# Patient Record
Sex: MALE
[De-identification: names, ages, dates, MRNs, and addresses within clinical notes are randomized per-mention and may not be internally consistent; named-entity substitution may affect disease eponyms.]

---

## 2021-04-04 ENCOUNTER — HOSPITAL ENCOUNTER (OUTPATIENT)
Dept: HOSPITAL 95 - ER | Age: 46
Setting detail: OBSERVATION
LOS: 1 days | Discharge: HOME | End: 2021-04-05
Attending: INTERNAL MEDICINE | Admitting: INTERNAL MEDICINE
Payer: COMMERCIAL

## 2021-04-04 VITALS — BODY MASS INDEX: 44.1 KG/M2 | HEIGHT: 70.98 IN | WEIGHT: 315 LBS

## 2021-04-04 DIAGNOSIS — Z87.891: ICD-10-CM

## 2021-04-04 DIAGNOSIS — T18.128A: Primary | ICD-10-CM

## 2021-04-04 DIAGNOSIS — E66.01: ICD-10-CM
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
## 2021-04-04 DIAGNOSIS — Z20.822: ICD-10-CM

## 2021-04-04 DIAGNOSIS — G47.33: ICD-10-CM

## 2021-04-04 LAB
FLUAV RNA SPEC QL NAA+PROBE: NEGATIVE
FLUBV RNA SPEC QL NAA+PROBE: NEGATIVE
RSV RNA SPEC QL NAA+PROBE: NEGATIVE
SARS-COV-2 RNA RESP QL NAA+PROBE: NEGATIVE

## 2021-04-04 PROCEDURE — 0DC58ZZ EXTIRPATION OF MATTER FROM ESOPHAGUS, VIA NATURAL OR ARTIFICIAL OPENING ENDOSCOPIC: ICD-10-PCS | Performed by: INTERNAL MEDICINE

## 2021-04-04 PROCEDURE — G0378 HOSPITAL OBSERVATION PER HR: HCPCS

## 2021-04-04 NOTE — NUR
PT INTO SDS VIA PAIGE FROM ER.
History, Chart, Medications and Allergies reviewed before start of
procedure.PT REPORTS BEING NPO SINCE 1730 WHEN HE "SWALLOWED A CHICKEN BONE".
Lungs clear T/O to Auscultation.
Patient States Post-Procedure ride home has been arranged WITH SISTER IF
DISCHARGED TONIGHT.

## 2021-04-04 NOTE — NUR
PACU RECOVERY
PATIENT AWAKE AND ON RA, SLIGHT DRY COUGH, AND SLIGHT PAIN TO THROAT. PATIENT
VERBALIZED UNDERSTANDING ON GOING SLOW WITH PO INTAKE. PATIENTS WIFE AND
SISTER AT BEDSIDE AND GOING HOME FOR THE NIGHT. PLAN FOR DC IN THE MORNING PER
DOCTOR OROURKE. REPORT CALLED TO MARITZA MONTGOMERY PATIENT TRANSFER TO ROOM 309 POST PACU
RECOVERY.

## 2021-04-04 NOTE — NUR
04/04/21 2039 Sho Neumann
History, Chart, Medications and Allergies reviewed before start of
procedure.See Anesthesia record. Bite Block Placed.

## 2021-04-05 NOTE — NUR
NIGHT SHIFT SUMMARY
 2220 PT ADMITTED TO MEDICAL FLOOR AT THIS TIME. PT WAS TRANSFERED FROM PACU
FROM RECENT SURGERY. PT IS A/O X4, TRANFERS WITH STANDBY ASSIST. PT IS
EXPERIENCING DIZZINESS FROM GENERALIZED ANESTHESIA. BED ALARM IS ON. DENIES
PAIN OTHER THAN SORE THROAT WHICH IS TO BE EXPECTED. TOLERATING ICE CHIPS
WELL. PT HAS SLEPT WELL TONIGHT SO HAS NOT ATTEMPTED TO DRINK LIQUIDS. ROOM
AIR MAINTAININING GOOD SATS. ONGOING POST OP VITALS IN PLACE. PLEASANT AND
COOPERATIVE. VSS. WILL CONTINUE TO MONITOR.

## 2021-04-05 NOTE — NUR
DISCHARGE SUMMARY
PATIENT DISCHARGED TO HOME. PATIENT ALERT AND ORIENTED THIS SHIFT. PATIENT
SHOWERED INDEPENDENTLY THIS SHIFT. IV REMOVED PRIOR TO DISCHARGE. DISCHARGE
INSTRUCTIONS EXPLAINED TO PATIENT. NO DISCHARGE MEDICATIONS. PATIENT DENIES
QUESTIONS AT THIS TIME. PATIENT TO VEHICLE BY WHEELCHAIR. FAMILY PROVIDING
TRANSPORTATION HOME.

## 2022-01-12 ENCOUNTER — HOSPITAL ENCOUNTER (INPATIENT)
Dept: HOSPITAL 95 - ER | Age: 47
LOS: 4 days | Discharge: HOME | DRG: 287 | End: 2022-01-16
Attending: HOSPITALIST | Admitting: HOSPITALIST
Payer: COMMERCIAL

## 2022-01-12 VITALS — HEIGHT: 71 IN | BODY MASS INDEX: 44.1 KG/M2 | WEIGHT: 315 LBS

## 2022-01-12 DIAGNOSIS — E66.01: ICD-10-CM

## 2022-01-12 DIAGNOSIS — Z53.29: ICD-10-CM

## 2022-01-12 DIAGNOSIS — Z98.890: ICD-10-CM

## 2022-01-12 DIAGNOSIS — I30.9: Primary | ICD-10-CM

## 2022-01-12 DIAGNOSIS — G47.33: ICD-10-CM

## 2022-01-12 DIAGNOSIS — Z20.822: ICD-10-CM

## 2022-01-12 DIAGNOSIS — Z87.891: ICD-10-CM

## 2022-01-12 DIAGNOSIS — E78.5: ICD-10-CM

## 2022-01-12 DIAGNOSIS — B34.9: ICD-10-CM

## 2022-01-12 LAB
ALBUMIN SERPL BCP-MCNC: 3.4 G/DL (ref 3.4–5)
ALBUMIN/GLOB SERPL: 0.7 {RATIO} (ref 0.8–1.8)
ALT SERPL W P-5'-P-CCNC: 46 U/L (ref 12–78)
ANION GAP SERPL CALCULATED.4IONS-SCNC: 5 MMOL/L (ref 6–16)
AST SERPL W P-5'-P-CCNC: 35 U/L (ref 12–37)
BASOPHILS # BLD AUTO: 0.04 K/MM3 (ref 0–0.23)
BASOPHILS NFR BLD AUTO: 0 % (ref 0–2)
BILIRUB SERPL-MCNC: 0.8 MG/DL (ref 0.1–1)
BUN SERPL-MCNC: 14 MG/DL (ref 8–24)
CA-I BLD-SCNC: 1.06 MMOL/L (ref 1.1–1.46)
CALCIUM SERPL-MCNC: 8.5 MG/DL (ref 8.5–10.1)
CHLORIDE BLD-SCNC: 103 MMOL/L (ref 98–108)
CHLORIDE SERPL-SCNC: 105 MMOL/L (ref 98–108)
CHOLEST SERPL-MCNC: 173 MG/DL (ref 50–200)
CHOLEST/HDLC SERPL: 5.2 {RATIO}
CO2 BLD-SCNC: 25 MMOL/L (ref 21–32)
CO2 SERPL-SCNC: 26 MMOL/L (ref 21–32)
CREAT BLD-MCNC: 1 MG/DL (ref 0.8–1.3)
CREAT SERPL-MCNC: 0.94 MG/DL (ref 0.6–1.2)
D DIMER PPP FEU-MCNC: 1.35 MG/L FEU (ref 0–0.52)
DEPRECATED RDW RBC AUTO: 41.6 FL (ref 35.1–46.3)
EOSINOPHIL # BLD AUTO: 0.23 K/MM3 (ref 0–0.68)
EOSINOPHIL NFR BLD AUTO: 2 % (ref 0–6)
ERYTHROCYTE [DISTWIDTH] IN BLOOD BY AUTOMATED COUNT: 13.2 % (ref 11.7–14.2)
FLUAV RNA SPEC QL NAA+PROBE: NEGATIVE
FLUBV RNA SPEC QL NAA+PROBE: NEGATIVE
GLOBULIN SER CALC-MCNC: 4.8 G/DL (ref 2.2–4)
GLUCOSE BLDC GLUCOMTR-MCNC: 135 MG/DL (ref 70–99)
GLUCOSE SERPL-MCNC: 140 MG/DL (ref 70–99)
HCT VFR BLD AUTO: 43.4 % (ref 37–53)
HCT VFR BLD CALC: 43 % (ref 41–53)
HDLC SERPL-MCNC: 33 MG/DL (ref 39–?)
HGB BLD CALC-MCNC: 14.6 G/DL (ref 13.5–17.5)
HGB BLD-MCNC: 14.5 G/DL (ref 13.5–17.5)
IMM GRANULOCYTES # BLD AUTO: 0.04 K/MM3 (ref 0–0.1)
IMM GRANULOCYTES NFR BLD AUTO: 0 % (ref 0–1)
LDLC SERPL CALC-MCNC: 117 MG/DL (ref 0–110)
LDLC/HDLC SERPL: 3.5 {RATIO}
LYMPHOCYTES # BLD AUTO: 3.62 K/MM3 (ref 0.84–5.2)
LYMPHOCYTES NFR BLD AUTO: 28 % (ref 21–46)
MAGNESIUM SERPL-MCNC: 2.3 MG/DL (ref 1.6–2.4)
MCHC RBC AUTO-ENTMCNC: 33.4 G/DL (ref 31.5–36.5)
MCV RBC AUTO: 86 FL (ref 80–100)
MONOCYTES # BLD AUTO: 0.73 K/MM3 (ref 0.16–1.47)
MONOCYTES NFR BLD AUTO: 6 % (ref 4–13)
NEUTROPHILS # BLD AUTO: 8.41 K/MM3 (ref 1.96–9.15)
NEUTROPHILS NFR BLD AUTO: 64 % (ref 41–73)
NRBC # BLD AUTO: 0 K/MM3 (ref 0–0.02)
NRBC BLD AUTO-RTO: 0 /100 WBC (ref 0–0.2)
PLATELET # BLD AUTO: 260 K/MM3 (ref 150–400)
POTASSIUM BLD-SCNC: 3.9 MMOL/L (ref 3.5–5.5)
POTASSIUM SERPL-SCNC: 3.9 MMOL/L (ref 3.5–5.5)
PROT SERPL-MCNC: 8.2 G/DL (ref 6.4–8.2)
PROTHROMBIN TIME: 11.6 SEC (ref 9.7–11.5)
RSV RNA SPEC QL NAA+PROBE: NEGATIVE
SARS-COV-2 RNA RESP QL NAA+PROBE: NEGATIVE
SODIUM BLD-SCNC: 138 MMOL/L (ref 135–148)
SODIUM SERPL-SCNC: 136 MMOL/L (ref 136–145)
TRIGL SERPL-MCNC: 116 MG/DL (ref 30–160)
TROPONIN I SERPL-MCNC: <0.015 NG/ML (ref 0–0.04)
VLDLC SERPL CALC-MCNC: 23 MG/DL (ref 6–32)

## 2022-01-12 PROCEDURE — A9270 NON-COVERED ITEM OR SERVICE: HCPCS

## 2022-01-12 PROCEDURE — G0378 HOSPITAL OBSERVATION PER HR: HCPCS

## 2022-01-12 PROCEDURE — C1769 GUIDE WIRE: HCPCS

## 2022-01-12 PROCEDURE — C1894 INTRO/SHEATH, NON-LASER: HCPCS

## 2022-01-12 PROCEDURE — C1887 CATHETER, GUIDING: HCPCS

## 2022-01-12 PROCEDURE — B2111ZZ FLUOROSCOPY OF MULTIPLE CORONARY ARTERIES USING LOW OSMOLAR CONTRAST: ICD-10-PCS | Performed by: INTERNAL MEDICINE

## 2022-01-12 NOTE — NUR
TR BAND
 
TR BAND OFF AT THIS TIME. NO BRUISING, NO BLEEDING, NO HEMATOMA. PULSE
PALPABLE, PERFUSION NOTED. OPSITE APPLIED. ARM BOARD IN PLACE.

## 2022-01-12 NOTE — NUR
PT UPDATE
 
PT REPORTS SHORTNESS OF BREATHE. SP02>94% ON RA. CALL PLACED TO MD CRESPO. MD CRESPO WITH ORDERS FOR PRN ALBUTEROL TREATMENTS W/ RT.

## 2022-01-13 LAB
ANION GAP SERPL CALCULATED.4IONS-SCNC: 7 MMOL/L (ref 6–16)
BUN SERPL-MCNC: 15 MG/DL (ref 8–24)
CALCIUM SERPL-MCNC: 8.6 MG/DL (ref 8.5–10.1)
CHLORIDE SERPL-SCNC: 105 MMOL/L (ref 98–108)
CHOLEST SERPL-MCNC: 174 MG/DL (ref 50–200)
CHOLEST/HDLC SERPL: 6 {RATIO}
CO2 SERPL-SCNC: 26 MMOL/L (ref 21–32)
CREAT SERPL-MCNC: 1.08 MG/DL (ref 0.6–1.2)
DEPRECATED RDW RBC AUTO: 43.4 FL (ref 35.1–46.3)
ERYTHROCYTE [DISTWIDTH] IN BLOOD BY AUTOMATED COUNT: 13.3 % (ref 11.7–14.2)
GLUCOSE SERPL-MCNC: 110 MG/DL (ref 70–99)
HCT VFR BLD AUTO: 42.1 % (ref 37–53)
HDLC SERPL-MCNC: 29 MG/DL (ref 39–?)
HGB BLD-MCNC: 13.8 G/DL (ref 13.5–17.5)
LDLC SERPL CALC-MCNC: 118 MG/DL (ref 0–110)
LDLC/HDLC SERPL: 4.1 {RATIO}
MCHC RBC AUTO-ENTMCNC: 32.8 G/DL (ref 31.5–36.5)
MCV RBC AUTO: 88 FL (ref 80–100)
NRBC # BLD AUTO: 0 K/MM3 (ref 0–0.02)
NRBC BLD AUTO-RTO: 0 /100 WBC (ref 0–0.2)
PLATELET # BLD AUTO: 289 K/MM3 (ref 150–400)
POTASSIUM SERPL-SCNC: 3.5 MMOL/L (ref 3.5–5.5)
SODIUM SERPL-SCNC: 138 MMOL/L (ref 136–145)
TRIGL SERPL-MCNC: 133 MG/DL (ref 30–160)
TROPONIN I SERPL-MCNC: <0.015 NG/ML (ref 0–0.04)
VLDLC SERPL CALC-MCNC: 26 MG/DL (ref 6–32)

## 2022-01-13 NOTE — NUR
SHIFT SUMMARY
 
PT A&OX4, PLEASANT. SP02>92% ON RA. PT C/O OF OCCASIONAL SOB. PRN NEB
TREATMENTS VIA RT, SEE PREVIOUS NOTE. PT HAS DRY, NON PRODUCTIVE COUGH.
TELEMETRY SHOWS NSR, HR 80'S. VSS. PT HAS R RADIAL SITE, RECOVERED, ARM BOARD
IN PLACE. NO BLEEDING, BRUISING, OR HEMATOMA. PT C/O OF 2/10 CP A FEW TIMES.
CALL PLACED TO MD ADLER. MD ADLER WITH ORDERS FOR TRAMADOL PRN.  PT USED
URINAL AT BEDSIDE. UP TO BATHROOM TO HAVE BM X1 THIS SHIFT. EAGER TO TAKE A
SHOWER. PT DOWN TO CT AT BEGINNING OF SHIFT. NS INFUSED X1 BAG PER EMAR. PT
ONLY SLEPT A FEW HOURS DURING SHIFT. CALL LIGHT IN REACH.

## 2022-01-13 NOTE — NUR
Pt A&OX4. PT IS PLEASANT. C/O SOB WHILE LAYING DOWN. SPO2 STAYED BETWEEN
98%-100%. PT REPORTS RELIEF AFTER NEBULIZER TREATMENT. PT C/O CHEST PAIN
WHILE AT REST X1 AND RATED 2/10. PT WAS RELIEVED WITHIN MINUTES, NO MEDICATION
NEEDED. PT REPORTS DRY COUGH AND HEADACHE OCCASIONALLY. PT USES URINAL AND
INDEPENDENTLY USES BATHROOM. RIGHT RADIAL ACCESS SITE WNL AND ARMBOARD IN
PLACE.

## 2022-01-14 NOTE — NUR
SHIFT NOTE
PT HAS BEEN RESTING WELL IN BED T/O THE DAY. HE DID REPORT A BRIEF EPISODE OF
MILD CP THIS AM WHICH RESOLVED WITHIN A MINUTE WITHOUT INTERVENTION. PT A/O
X4. VSS. DENIES FURTHER CP, DENIES SOB. RADIAL ACCESS INTACT. PT HAS BEEN UP
TO SHOWER TODAY. AWAITING RESULTS OF ECHO AT THIS TIME.

## 2022-01-14 NOTE — NUR
SHIFT SUMMARY
 
PT A&OX4. SP02>90% ON RA. PT WORE 2L NC AT BEGINNING OF SHIFT, BUT REMOVED IT
MID SHIFT AND CONTINUED TO SAT FINE. DRY COUGH NOTED. END OF SHIFT PT STATES
IT FEELS BETTER TO BREATHE, FIRST TIME HE COULD LAY FLAT IN A MONTH. TELEMETRY
SHOWS NSR, HR 70'S-90'S. VSS. PT USED URINAL AT BEDSIDE. PT C/O OF
UNCOMFORTABLE MATTERESS, FOUND UP SITTING IN A CHAIR AT 3 AM. EGG CRATE FOAM
PLACED ON MATTRESS WITH SOME RELIEF. PT DENIES ANY CP DURING SHIFT. CALL LIGHT
IN REACH.

## 2022-01-15 NOTE — NUR
SHIFT SUMMARY;
 
ASSUMED CARE AT 0700. A/A/OX4. INDEPDANT IN ROOM. DENIES CP OR SOB. VSS. NO
ACUTE MEDICAL CHANGES DURING SHIFT. PLEASANT AND COOPERATIVE WITH CARE. WILL
CONTINUE TO MONITOR AND TREAT UNTIL CHANGE OF SHIFT.

## 2022-01-15 NOTE — NUR
SHIFT SUMMARY
 
NO ACUTE CHANGES THIS SHIFT. VSS. PT AXO. ON RA. IN SR. DENYING CP/PRESSURE.
PT INDEPENDENT IN ROOM. AWAITING ECHO RESULTS. TR BAND SITE WITH OPSITE
WITHOUT HEMATOMA. ARMBOARD OFF NOW. OTHERWISE, PT RESTING IN ROOM QUIETELY.
USING CALL LIGHT APPROPRIATELY.

## 2022-01-15 NOTE — NUR
ASSUMED CARE. AOX3, INDEPENDENT IN THE ROOM. REPORTS THAT HE DOES GET SOB WHEN
HE BENDS OVER TO PICK SOMETHING UP OR AT TIMES WHEN HE LAYS FLAT. HE IS AFRAID
OF THAT OCCURRING TONIGHT AS THE DOCTOR HAD TOLD HIM THAT HE HAS MORE FLUID ON
THE HEART. HE IS WORRIED THAT HE CAN NOT GO HOME. LUNG SOUNDS ARE CLEAR. HR AT
THIS TIME IS SINUS. NO EDEMA, NO PAIN. ABLE TO MOVE FREELY IN THE ROOM WITH NO
ISSUES AT THIS TIME. CALL LIGHT IS IN REACH. WILL MONITOR.

## 2022-01-16 NOTE — NUR
SHIFT SUMMARY: AOX3, INDEPENDENT. ABLE TO MOVE AROUND FREELY IN ROOM WITH
SCANT AMOUNT OF SOB. STATES HE ONLY FEELS IT WHEN HE BENDS OVER OR LAYS FLAT.
DID HAVE ONE EPISODE WHERE HE PICKED UP SOMETHING OFF THE FLOOR AND REPORTED
VISION GOING BLACK, DIZZINESS, BUT IT WAS SHORT LIVED AND DID NOT OCCUR AGAIN.
VS HAVE BEEN STABLE, TELE WNL. MD DID INFORM HIM THAT THE FLUID HAS INCREASED
SOME IN THE PERICARDIAL, WHICH HE IS WORRIED ABOUT. PLAN IS FOR ANOTHER ECHO
AND POSSIBLE PERICARDIOCENTISIS. DEPENDING ON ECHO HE MAY GO HOME AS WELL. NO
ACUTE CHANGES, CALL LIGHT REMAINS IN REACH.

## 2022-01-16 NOTE — NUR
DISCHARGED EDUCATION REVIEWED WITH PT INCLUDING FOLLOW UP APPOINTMENTS,
MEDICATION LIST, NEW PRESCRIPTIONS, AND EDUCATION MATERIAL. PT VERBALIZES
UNDERSTANDING, NO QUESTIONS OR CONCERNS. RXs SENT TO PT'S PHARMACY OF CHOICE.
NO DISCHARGE NEEDS IDENTIFIED. ALL BELONGINGS SENT HOME WITH PT. PT DISCHARGED
IN THE CARE OF FAMILY. AMBULATORY GAIT STEADY.

## 2022-01-16 NOTE — NUR
DISCHARGE NOTE
REMY FERRARO RN DISCHARGED PT. THIS NURSE WAS IN PT ROOM WHEN DR VILLALOBOS
WENT OVER PLAN OF CARE TO CONDUCT ANOTHER ECHO ON WEDNESDAY AS WELL AS PLAN
TO DISCHATGE.
VITAL SIGNS WERE
STABLE T/O SHIFT. NO ACUTE CHANGES NOTED.

## 2022-12-30 ENCOUNTER — HOSPITAL ENCOUNTER (EMERGENCY)
Dept: HOSPITAL 95 - ER | Age: 47
Discharge: HOME | End: 2022-12-30
Payer: COMMERCIAL

## 2022-12-30 VITALS — BODY MASS INDEX: 42 KG/M2 | WEIGHT: 300.01 LBS | HEIGHT: 71 IN

## 2022-12-30 DIAGNOSIS — Z79.899: ICD-10-CM

## 2022-12-30 DIAGNOSIS — Z87.891: ICD-10-CM

## 2022-12-30 DIAGNOSIS — S29.012A: Primary | ICD-10-CM
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
## 2022-12-30 DIAGNOSIS — E66.01: ICD-10-CM
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
## 2023-08-27 ENCOUNTER — HOSPITAL ENCOUNTER (EMERGENCY)
Dept: HOSPITAL 95 - ER | Age: 48
Discharge: HOME | End: 2023-08-27
Payer: SELF-PAY

## 2023-08-27 VITALS — HEIGHT: 71 IN | WEIGHT: 315 LBS | BODY MASS INDEX: 44.1 KG/M2

## 2023-08-27 VITALS — DIASTOLIC BLOOD PRESSURE: 105 MMHG | SYSTOLIC BLOOD PRESSURE: 156 MMHG

## 2023-08-27 DIAGNOSIS — R07.9: Primary | ICD-10-CM

## 2023-08-27 DIAGNOSIS — Z87.891: ICD-10-CM

## 2023-08-27 DIAGNOSIS — G47.33: ICD-10-CM

## 2023-08-27 LAB
ALBUMIN SERPL BCP-MCNC: 3.9 G/DL (ref 3.4–5)
ALBUMIN/GLOB SERPL: 1 {RATIO} (ref 0.8–1.8)
ALT SERPL W P-5'-P-CCNC: 51 U/L (ref 12–78)
ANION GAP SERPL CALCULATED.4IONS-SCNC: 6 MMOL/L (ref 6–16)
AST SERPL W P-5'-P-CCNC: 30 U/L (ref 12–37)
BASOPHILS # BLD AUTO: 0.04 K/MM3 (ref 0–0.23)
BASOPHILS NFR BLD AUTO: 0 % (ref 0–2)
BILIRUB SERPL-MCNC: 0.5 MG/DL (ref 0.1–1)
BUN SERPL-MCNC: 11 MG/DL (ref 8–24)
CALCIUM SERPL-MCNC: 8.8 MG/DL (ref 8.5–10.1)
CHLORIDE SERPL-SCNC: 110 MMOL/L (ref 98–108)
CO2 SERPL-SCNC: 24 MMOL/L (ref 21–32)
CREAT SERPL-MCNC: 0.93 MG/DL (ref 0.6–1.2)
DEPRECATED RDW RBC AUTO: 42.3 FL (ref 35.1–46.3)
EOSINOPHIL # BLD AUTO: 0.21 K/MM3 (ref 0–0.68)
EOSINOPHIL NFR BLD AUTO: 2 % (ref 0–6)
ERYTHROCYTE [DISTWIDTH] IN BLOOD BY AUTOMATED COUNT: 13.5 % (ref 11.7–14.2)
GLOBULIN SER CALC-MCNC: 4 G/DL (ref 2.2–4)
GLUCOSE SERPL-MCNC: 114 MG/DL (ref 70–99)
HCT VFR BLD AUTO: 48.3 % (ref 37–53)
HGB BLD-MCNC: 16.4 G/DL (ref 13.5–17.5)
IMM GRANULOCYTES # BLD AUTO: 0.02 K/MM3 (ref 0–0.1)
IMM GRANULOCYTES NFR BLD AUTO: 0 % (ref 0–1)
LYMPHOCYTES # BLD AUTO: 3.07 K/MM3 (ref 0.84–5.2)
LYMPHOCYTES NFR BLD AUTO: 31 % (ref 21–46)
MCHC RBC AUTO-ENTMCNC: 34 G/DL (ref 31.5–36.5)
MCV RBC AUTO: 86 FL (ref 80–100)
MONOCYTES # BLD AUTO: 0.44 K/MM3 (ref 0.16–1.47)
MONOCYTES NFR BLD AUTO: 4 % (ref 4–13)
NEUTROPHILS # BLD AUTO: 6.21 K/MM3 (ref 1.96–9.15)
NEUTROPHILS NFR BLD AUTO: 62 % (ref 41–73)
NRBC # BLD AUTO: 0 K/MM3 (ref 0–0.02)
NRBC BLD AUTO-RTO: 0 /100 WBC (ref 0–0.2)
PLATELET # BLD AUTO: 212 K/MM3 (ref 150–400)
POTASSIUM SERPL-SCNC: 4.1 MMOL/L (ref 3.5–5.5)
PROT SERPL-MCNC: 7.9 G/DL (ref 6.4–8.2)
SODIUM SERPL-SCNC: 140 MMOL/L (ref 136–145)